# Patient Record
Sex: FEMALE | Race: BLACK OR AFRICAN AMERICAN
[De-identification: names, ages, dates, MRNs, and addresses within clinical notes are randomized per-mention and may not be internally consistent; named-entity substitution may affect disease eponyms.]

---

## 2017-04-03 ENCOUNTER — HOSPITAL ENCOUNTER (INPATIENT)
Dept: HOSPITAL 63 - ER | Age: 77
LOS: 1 days | Discharge: NURSING, REHAB OR LONG-TERM CARE | DRG: 811 | End: 2017-04-04
Attending: FAMILY MEDICINE | Admitting: FAMILY MEDICINE
Payer: MEDICARE

## 2017-04-03 VITALS — HEIGHT: 62 IN | BODY MASS INDEX: 37.54 KG/M2 | WEIGHT: 204 LBS

## 2017-04-03 DIAGNOSIS — E43: ICD-10-CM

## 2017-04-03 DIAGNOSIS — E78.00: ICD-10-CM

## 2017-04-03 DIAGNOSIS — Z79.899: ICD-10-CM

## 2017-04-03 DIAGNOSIS — I12.9: ICD-10-CM

## 2017-04-03 DIAGNOSIS — N18.4: ICD-10-CM

## 2017-04-03 DIAGNOSIS — K21.9: ICD-10-CM

## 2017-04-03 DIAGNOSIS — I10: ICD-10-CM

## 2017-04-03 DIAGNOSIS — D64.9: Primary | ICD-10-CM

## 2017-04-03 LAB
ALBUMIN SERPL-MCNC: 2.4 G/DL (ref 3.4–5)
ALP SERPL-CCNC: 77 U/L (ref 46–116)
ALT SERPL-CCNC: 25 U/L (ref 14–59)
AMPHETAMINE/METHAMPHETAMINE: (no result)
ANION GAP SERPL CALC-SCNC: 9 MMOL/L (ref 6–14)
APTT PPP: YELLOW S
AST SERPL-CCNC: 30 U/L (ref 15–37)
BACTERIA #/AREA URNS HPF: 0 /HPF
BARBITURATES UR-MCNC: (no result) UG/ML
BASOPHILS # BLD AUTO: 0.1 X10^3/UL (ref 0–0.2)
BASOPHILS NFR BLD: 1 % (ref 0–3)
BENZODIAZ UR-MCNC: (no result) UG/L
BILIRUB DIRECT SERPL-MCNC: 0.2 MG/DL (ref 0–0.2)
BILIRUB SERPL-MCNC: 0.5 MG/DL (ref 0.2–1)
BILIRUB UR QL STRIP: (no result)
CA-I SERPL ISE-MCNC: 53 MG/DL (ref 7–20)
CALCIUM SERPL-MCNC: 8.4 MG/DL (ref 8.5–10.1)
CANNABINOIDS UR-MCNC: (no result) UG/L
CHLORIDE SERPL-SCNC: 102 MMOL/L (ref 98–107)
CO2 SERPL-SCNC: 28 MMOL/L (ref 21–32)
COCAINE UR-MCNC: (no result) NG/ML
CREAT SERPL-MCNC: 2 MG/DL (ref 0.6–1)
EOSINOPHIL NFR BLD: 0.3 X10^3/UL (ref 0–0.7)
EOSINOPHIL NFR BLD: 4 % (ref 0–3)
ERYTHROCYTE [DISTWIDTH] IN BLOOD BY AUTOMATED COUNT: 13.1 % (ref 11.5–14.5)
FIBRINOGEN PPP-MCNC: CLEAR MG/DL
GFR SERPLBLD BASED ON 1.73 SQ M-ARVRAT: 29.2 ML/MIN
GLUCOSE SERPL-MCNC: 102 MG/DL (ref 70–99)
GLUCOSE UR STRIP-MCNC: (no result) MG/DL
HCT VFR BLD CALC: 20.8 % (ref 36–47)
HGB BLD-MCNC: 6.9 G/DL (ref 12–15.5)
LIPASE: 141 U/L (ref 73–393)
LYMPHOCYTES # BLD: 1.5 X10^3/UL (ref 1–4.8)
LYMPHOCYTES NFR BLD AUTO: 18 % (ref 24–48)
MCH RBC QN AUTO: 29 PG (ref 25–35)
MCHC RBC AUTO-ENTMCNC: 33 G/DL (ref 31–37)
MCV RBC AUTO: 86 FL (ref 79–100)
METHADONE SERPL-MCNC: (no result) NG/ML
MONO #: 1.1 X10^3/UL (ref 0–1.1)
MONOCYTES NFR BLD: 13 % (ref 0–9)
NEUT #: 5.4 X10^3UL (ref 1.8–7.7)
NEUTROPHILS NFR BLD AUTO: 65 % (ref 31–73)
NITRITE UR QL STRIP: (no result)
OPIATES UR-MCNC: (no result) NG/ML
PCP SERPL-MCNC: (no result) MG/DL
PLATELET # BLD AUTO: 343 X10^3/UL (ref 140–400)
POTASSIUM SERPL-SCNC: 3.7 MMOL/L (ref 3.5–5.1)
PROT SERPL-MCNC: 6.6 G/DL (ref 6.4–8.2)
RBC # BLD AUTO: 2.4 X10^6/UL (ref 3.5–5.4)
RBC #/AREA URNS HPF: 0 /HPF (ref 0–2)
SODIUM SERPL-SCNC: 139 MMOL/L (ref 136–145)
SP GR UR STRIP: <=1.005
SQUAMOUS #/AREA URNS LPF: (no result) /LPF
UROBILINOGEN UR-MCNC: 0.2 MG/DL
WBC # BLD AUTO: 8.4 X10^3/UL (ref 4–11)
WBC #/AREA URNS HPF: (no result) /HPF (ref 0–4)

## 2017-04-03 PROCEDURE — 85045 AUTOMATED RETICULOCYTE COUNT: CPT

## 2017-04-03 PROCEDURE — 82274 ASSAY TEST FOR BLOOD FECAL: CPT

## 2017-04-03 PROCEDURE — 96374 THER/PROPH/DIAG INJ IV PUSH: CPT

## 2017-04-03 PROCEDURE — P9016 RBC LEUKOCYTES REDUCED: HCPCS

## 2017-04-03 PROCEDURE — 86901 BLOOD TYPING SEROLOGIC RH(D): CPT

## 2017-04-03 PROCEDURE — 86900 BLOOD TYPING SEROLOGIC ABO: CPT

## 2017-04-03 PROCEDURE — 80053 COMPREHEN METABOLIC PANEL: CPT

## 2017-04-03 PROCEDURE — 84484 ASSAY OF TROPONIN QUANT: CPT

## 2017-04-03 PROCEDURE — 36415 COLL VENOUS BLD VENIPUNCTURE: CPT

## 2017-04-03 PROCEDURE — 96375 TX/PRO/DX INJ NEW DRUG ADDON: CPT

## 2017-04-03 PROCEDURE — 85027 COMPLETE CBC AUTOMATED: CPT

## 2017-04-03 PROCEDURE — 85610 PROTHROMBIN TIME: CPT

## 2017-04-03 PROCEDURE — 80076 HEPATIC FUNCTION PANEL: CPT

## 2017-04-03 PROCEDURE — 80048 BASIC METABOLIC PNL TOTAL CA: CPT

## 2017-04-03 PROCEDURE — 86920 COMPATIBILITY TEST SPIN: CPT

## 2017-04-03 PROCEDURE — 93005 ELECTROCARDIOGRAM TRACING: CPT

## 2017-04-03 PROCEDURE — 81001 URINALYSIS AUTO W/SCOPE: CPT

## 2017-04-03 PROCEDURE — 85730 THROMBOPLASTIN TIME PARTIAL: CPT

## 2017-04-03 PROCEDURE — 74022 RADEX COMPL AQT ABD SERIES: CPT

## 2017-04-03 PROCEDURE — 86850 RBC ANTIBODY SCREEN: CPT

## 2017-04-03 PROCEDURE — S0028 INJECTION, FAMOTIDINE, 20 MG: HCPCS

## 2017-04-03 PROCEDURE — 83690 ASSAY OF LIPASE: CPT

## 2017-04-03 NOTE — ED.ADGEN
Past History


Past Medical History:  Arthritis, GERD, High Cholesterol, Hypertension, Other


Past Surgical History:  No Surgical History, Other


Alcohol Use:  None


Drug Use:  None





Adult General


Chief Complaint


Chief Complaint


".. I been really tired ... and weak.. and they said I was anemic again.. and 

will need a transfusion again..."





HPI


HPI





Patient is a 76 year old female who presents with above hx  and complaints.  

She has a history of chronic anemia. Patient denies any GI symptoms or dark 

tarry stools. Etiology of anemia has not been determined and prior admissions 

and transfusions. Patient follows with Dr. Davies.  Patient denies any 

change in meds. Patient denies any travel. Patient denies any ill contacts. 

Patient denies any immunosuppression.





Review of Systems


Review of Systems





Constitutional: Denies fever or chills []


Eyes: Denies change in visual acuity, redness, or eye pain []


HENT: Denies nasal congestion or sore throat []


Respiratory: Denies cough or shortness of breath []


Cardiovascular: No additional information not addressed in HPI []


GI: Denies abdominal pain, nausea, vomiting, bloody stools or diarrhea []


: Denies dysuria or hematuria []


Musculoskeletal: Denies back pain or joint pain . Pt. has complaints of 

generalized fatigue and weakness


Integument: Denies rash or skin lesions []


Neurologic: Denies headache, focal weakness or sensory changes []  

Pt.complaints of dizziness


Endocrine: Denies polyuria or polydipsia []





Family History


Family History


Noncontributory





Current Medications


Current Medications





 Current Medications








 Medications


  (Trade)  Dose


 Ordered  Sig/Jeanne  Start Time


 Stop Time Status Last Admin


Dose Admin


 


 Famotidine


  (Pepcid)  20 mg  1X  ONCE  4/3/17 21:30


 4/3/17 21:31 DC 4/3/17 21:30


20 MG


 


 Ondansetron HCl


  (Zofran)  4 mg  1X  ONCE  4/3/17 21:30


 4/3/17 21:31 DC 4/3/17 21:30


4 MG


 


 Sodium Chloride


  (Iv Sodium


 Chloride 0.9%


 1,000ml)  1,000 ml @ 


 100 mls/hr  Q10H  4/3/17 21:01


 4/4/17 07:00 DC 4/3/17 21:01


100 MLS/HR





See nursing for home meds





Allergies


Allergies





 Allergies








Coded Allergies Type Severity Reaction Last Updated Verified


 


  No Known Drug Allergies    3/12/16 No











Physical Exam


Physical Exam





Constitutional: , no acute distress, non-toxic appearance. []


HENT: Normocephalic, atraumatic, bilateral external ears normal, oropharynx 

moist, no oral exudates, nose normal. []


Eyes: PERRLA, EOMI, conjunctiva pale, no discharge. [] Glasses


Neck: Normal range of motion, no tenderness, supple, no stridor. [] 


Cardiovascular:Heart rate regular rhythm, no murmur, PMI to the left


Lungs & Thorax:  Bilateral breath sounds clear to auscultation []


Abdomen: Bowel sounds normal, soft, no tenderness, no masses, no pulsatile 

masses. Obese. Declines rectal at this time.


Skin: Warm, dry, no erythema, no rash. [] 


Back: No tenderness, no CVA tenderness. [] 


Extremities: No tenderness, no cyanosis, no clubbing, ROM intact, no edema. [] 


Neurologic: Alert and oriented X 3,  no gross motor function deficits, no gross 

sensory function deficits, no gross focal deficits noted. []


Psychologic: Affect anxious, judgement normal, mood normal. []





Current Patient Data


Lab Results





 Laboratory Tests








Test


  4/3/17


20:20


 


White Blood Count


  8.4x10^3/uL


(4.0-11.0)


 


Red Blood Count


  2.40x10^6/uL


(3.50-5.40)  L


 


Hemoglobin


  6.9g/dL


(12.0-15.5)  *L


 


Hematocrit


  20.8%


(36.0-47.0)  L


 


Mean Corpuscular Volume 86fL ()  


 


Mean Corpuscular Hemoglobin 29pg (25-35)  


 


Mean Corpuscular Hemoglobin


Concent 33g/dL (31-37)


 


 


Red Cell Distribution Width


  13.1%


(11.5-14.5)


 


Platelet Count


  343x10^3/uL


(140-400)


 


Neutrophils (%) (Auto) 65% (31-73)  


 


Lymphocytes (%) (Auto) 18% (24-48)  L


 


Monocytes (%) (Auto) 13% (0-9)  H


 


Eosinophils (%) (Auto) 4% (0-3)  H


 


Basophils (%) (Auto) 1% (0-3)  


 


Neutrophils # (Auto)


  5.4x10^3uL


(1.8-7.7)


 


Lymphocytes # (Auto)


  1.5x10^3/uL


(1.0-4.8)


 


Monocytes # (Auto)


  1.1x10^3/uL


(0.0-1.1)


 


Eosinophils # (Auto)


  0.3x10^3/uL


(0.0-0.7)


 


Basophils # (Auto)


  0.1x10^3/uL


(0.0-0.2)


 


Prothrombin Time


  9.7SEC


(9.4-11.4)


 


Prothrombin Time INR 0.9 (0.9-1.1)  


 


PTT 26SEC (23-33)  


 


Sodium Level


  139mmol/L


(136-145)


 


Potassium Level


  3.7mmol/L


(3.5-5.1)


 


Chloride Level


  102mmol/L


()


 


Carbon Dioxide Level


  28mmol/L


(21-32)


 


Anion Gap 9 (6-14)  


 


Blood Urea Nitrogen


  53mg/dL (7-20)


H


 


Creatinine


  2.0mg/dL


(0.6-1.0)  H


 


Estimated GFR


(Cockcroft-Gault) 29.2  


 


 


Glucose Level


  102mg/dL


(70-99)  H


 


Calcium Level


  8.4mg/dL


(8.5-10.1)  L


 


Total Bilirubin


  0.5mg/dL


(0.2-1.0)


 


Direct Bilirubin


  0.2mg/dL


(0.0-0.2)


 


Aspartate Amino Transferase


(AST) 30U/L (15-37)  


 


 


Alanine Aminotransferase (ALT) 25U/L (14-59)  


 


Alkaline Phosphatase


  77U/L ()


 


 


Troponin I Quantitative


  0.040ng/mL


(0-0.055)


 


Total Protein


  6.6g/dL


(6.4-8.2)


 


Albumin


  2.4g/dL


(3.4-5.0)  L


 


Lipase


  141U/L


()











EKG


EKG


My interpretation EKG shows a sinus rhythm at 72 bpm. No findings acute STEMI 

with contralateral changes. []





Radiology/Procedures


Radiology/Procedures


I interpretation of chest x-ray shows borderline cardiomegaly. Degenerative 

joint changes. Calcified aortic .  Nonspecific bowel gas pattern []





Course & Med Decision Making


Course & Med Decision Making


Pertinent Labs and Imaging studies reviewed. (See chart for details)





Discussed presentation, testing and treatment plan with Dr. Davies. Will 

admit for further evaluation and transfusion





[]





Final Impression


Final Impression


1.  Anemia[] 


2. Elevated creatinine  & BUN


3. Severe malnutrition albumin 2.4


4. History of hypertension


5. Complaints of generalized fatigue and weakness


6. Arthritis


Problems:  





Dragon Disclaimer


Dragon Disclaimer


This electronic medical record was generated, in whole or in part, using a 

voice recognition dictation system.








KATT BARBER MD Apr 3, 2017 21:48

## 2017-04-03 NOTE — EKG
[facility] [address], [place] [address]

Test Date:    2017               Test Time:    22:02:58

Pat Name:     IRASEMA ANGLIN           Department:   

Patient ID:   [id number]           Room:          

Gender:       F                        Technician:   TALISHA

:          1940               Requested By: KATT BARBER

Order Number: [id number]            Reading MD:   Booker Puentes

                                 Measurements

Intervals                              Axis          

Rate:         72                       P:            29

GA:           154                      QRS:          7

QRSD:         76                       T:            36

QT:           378                                    

QTc:          415                                    

                           Interpretive Statements

SINUS RHYTHM



Electronically Signed On 2017 9:27:36 CDT by Booker Puentes

## 2017-04-03 NOTE — ACF
Admission Criteria Forms


                                                         


            ANEMIA, IRON DEFICIENCY OR UNSPECIFIED





Clinical Indications for Inpatient Care





                                                         (Place 'X' for any and 

all applicable criteria):





Admission is indicated for ANY ONE of the following(1)(2)(3)(4)(5)(6)(7): 





[X] I.   Inpatient admission required rather than observation care (Also use 

Anemia, Iron Deficiency or


        Unspecified:  Observation Care guideline as appropriate)  because of 

ANY ONE of the following:


        [] a)   Hemodynamic instability that is severe or persistent


        [] b)   Active bleeding that cannot be rapidly controlled


        [] c)   CVS symptoms (i.e., dyspnea, chest pain, heart failure) that 

are severe or persistent               


        [] d)   Neurologic symptoms (i.e., cognitive impairment, recurrent 

syncope or near syncope) that are severe or persistent


        [] e)   Cardiac arrhythmias of immediate concern


        [] f)    Acute peripheral ischemia (e.g., pulseless, cool, mottled, or 

cyanotic extremity)


        [] g)   High-risk low platelet count                  


        [] h)   Acute renal failure


        [] i)    Ongoing transfusion for blood loss (greater than 2 units)


        [] j)    IV fluid to replace significant ongoing (eg, >24 hours) losses 

(> 3 L/m2 per day)


        [] k)   Pulmonary artery catheter monitoring 


        [] l)    Supplemental oxygen or respiratory treatments for over 24 

hours that are performable only  in acute inpatient setting


        [] m)  Immediate inpatient surgery


        [X] n)   Other condition, treatment or monitoring requiring inpatient 

admission


[] II    Active massive hemorrhage


[] III.  Active hemolysis with rapidly progressive anemia [A](6)











Extended stay beyond goal length of stay may be needed for (17)(18)


[]a)   Diagnosed cause of anemia requiring longer hospitalization (eg, active 

GI bleeding, 


        immune hemolysis requiring electrophoresis, complications of malignancy 

requiring acute care 


[]b)   Continued emergent anemia indicators (23)            


[]c)   Transfusion reactions   


[]d)   Associated leukopenia or thrombocytopenia needing inpatient care   


[]e)   Active comorbidities (eg, renal failure, heart failure)











The original [facility] content created by [facility] 

Guidelines has been revised. 


The portions of the content which have been revised are identified through the 

use of italic text or in bold. [facility] 


has neither reviewed nor approved the modified material. All other unmodified 

content is copyright  [facility].





Please see references footnoted in the original [facility] edition 

2016





Admission Criteria Met?:  Yes








KERI PÉREZ Apr 3, 2017 22:31

## 2017-04-04 VITALS
SYSTOLIC BLOOD PRESSURE: 159 MMHG | SYSTOLIC BLOOD PRESSURE: 159 MMHG | DIASTOLIC BLOOD PRESSURE: 76 MMHG | SYSTOLIC BLOOD PRESSURE: 159 MMHG | DIASTOLIC BLOOD PRESSURE: 76 MMHG | DIASTOLIC BLOOD PRESSURE: 76 MMHG

## 2017-04-04 VITALS — DIASTOLIC BLOOD PRESSURE: 59 MMHG | SYSTOLIC BLOOD PRESSURE: 123 MMHG

## 2017-04-04 VITALS — DIASTOLIC BLOOD PRESSURE: 52 MMHG | SYSTOLIC BLOOD PRESSURE: 123 MMHG

## 2017-04-04 VITALS — SYSTOLIC BLOOD PRESSURE: 159 MMHG | DIASTOLIC BLOOD PRESSURE: 76 MMHG

## 2017-04-04 VITALS — SYSTOLIC BLOOD PRESSURE: 126 MMHG | DIASTOLIC BLOOD PRESSURE: 48 MMHG

## 2017-04-04 VITALS — DIASTOLIC BLOOD PRESSURE: 53 MMHG | SYSTOLIC BLOOD PRESSURE: 117 MMHG

## 2017-04-04 VITALS — SYSTOLIC BLOOD PRESSURE: 122 MMHG | DIASTOLIC BLOOD PRESSURE: 48 MMHG

## 2017-04-04 VITALS — DIASTOLIC BLOOD PRESSURE: 70 MMHG | SYSTOLIC BLOOD PRESSURE: 124 MMHG

## 2017-04-04 VITALS — DIASTOLIC BLOOD PRESSURE: 53 MMHG | SYSTOLIC BLOOD PRESSURE: 127 MMHG

## 2017-04-04 VITALS — SYSTOLIC BLOOD PRESSURE: 120 MMHG | DIASTOLIC BLOOD PRESSURE: 49 MMHG

## 2017-04-04 VITALS — SYSTOLIC BLOOD PRESSURE: 123 MMHG | DIASTOLIC BLOOD PRESSURE: 59 MMHG

## 2017-04-04 LAB
ALBUMIN SERPL-MCNC: 2.2 G/DL (ref 3.4–5)
ALBUMIN/GLOB SERPL: 0.6 {RATIO} (ref 1–1.7)
ALP SERPL-CCNC: 68 U/L (ref 46–116)
ALT SERPL-CCNC: 20 U/L (ref 14–59)
ANION GAP SERPL CALC-SCNC: 7 MMOL/L (ref 6–14)
AST SERPL-CCNC: 23 U/L (ref 15–37)
BASOPHILS # BLD AUTO: 0.1 X10^3/UL (ref 0–0.2)
BASOPHILS NFR BLD: 1 % (ref 0–3)
BILIRUB SERPL-MCNC: 0.6 MG/DL (ref 0.2–1)
BUN/CREAT SERPL: 26 (ref 6–20)
CA-I SERPL ISE-MCNC: 44 MG/DL (ref 7–20)
CALCIUM SERPL-MCNC: 8.4 MG/DL (ref 8.5–10.1)
CHLORIDE SERPL-SCNC: 106 MMOL/L (ref 98–107)
CO2 SERPL-SCNC: 28 MMOL/L (ref 21–32)
CREAT SERPL-MCNC: 1.7 MG/DL (ref 0.6–1)
EOSINOPHIL NFR BLD: 0.3 X10^3/UL (ref 0–0.7)
EOSINOPHIL NFR BLD: 5 % (ref 0–3)
ERYTHROCYTE [DISTWIDTH] IN BLOOD BY AUTOMATED COUNT: 13.8 % (ref 11.5–14.5)
FECAL OB PT: NEGATIVE
GFR SERPLBLD BASED ON 1.73 SQ M-ARVRAT: 35.2 ML/MIN
GLOBULIN SER-MCNC: 4 G/DL (ref 2.2–3.8)
GLUCOSE SERPL-MCNC: 96 MG/DL (ref 70–99)
HCT VFR BLD CALC: 25.2 % (ref 36–47)
HGB BLD-MCNC: 8.4 G/DL (ref 12–15.5)
LYMPHOCYTES # BLD: 1.3 X10^3/UL (ref 1–4.8)
LYMPHOCYTES NFR BLD AUTO: 18 % (ref 24–48)
MCH RBC QN AUTO: 29 PG (ref 25–35)
MCHC RBC AUTO-ENTMCNC: 33 G/DL (ref 31–37)
MCV RBC AUTO: 86 FL (ref 79–100)
MONO #: 1.1 X10^3/UL (ref 0–1.1)
MONOCYTES NFR BLD: 15 % (ref 0–9)
NEUT #: 4.5 X10^3UL (ref 1.8–7.7)
NEUTROPHILS NFR BLD AUTO: 62 % (ref 31–73)
PLATELET # BLD AUTO: 301 X10^3/UL (ref 140–400)
POTASSIUM SERPL-SCNC: 3.8 MMOL/L (ref 3.5–5.1)
PROT SERPL-MCNC: 6.2 G/DL (ref 6.4–8.2)
RBC # BLD AUTO: 2.93 X10^6/UL (ref 3.5–5.4)
SODIUM SERPL-SCNC: 141 MMOL/L (ref 136–145)
WBC # BLD AUTO: 7.3 X10^3/UL (ref 4–11)

## 2017-04-04 PROCEDURE — 30233N1 TRANSFUSION OF NONAUTOLOGOUS RED BLOOD CELLS INTO PERIPHERAL VEIN, PERCUTANEOUS APPROACH: ICD-10-PCS | Performed by: EMERGENCY MEDICINE

## 2017-04-04 NOTE — RAD
Acute abdomen series 





History:  Evaluate GI bleeding, anemia, constipation.



Comparison:  None. 



Findings:  Frontal view of the chest.  Cardiac silhouette appears within

normal limits for size.  No pneumoperitoneum or pneumothorax is identified. 

No acute infiltrate is seen.  Aortic atherosclerosis is seen.



Supine and upright views of the abdomen.  No dilated loops of bowel are seen. 

Mild-moderate colonic stool is present. Degenerative changes are present with

spine. Multiple calcifications are scattered in the pelvis.



Impression:  No acute abnormality identified in the chest or abdomen.

## 2017-04-10 NOTE — HP
ADMIT DATE:  04/03/2017



HISTORY OF PRESENT ILLNESS:  A 76-year-old female recently had a knee

replacement performed and as a result of this, the patient came in needing a

blood transfusion.  She was noted to be very weak and tired, could not go on as

a result of this severe anemia with a hemoglobin of 6.9 and 18 or so, the

patient was admitted to the hospital for observation for IV transfusion of some

packed RBCs.



PAST MEDICAL HISTORY:  Cardiac problems, hypercholesterinemia, hypertension,

arthritis, orthopedic surgery left knee replacement 2017 and right knee in 2016.

 She is having chronic anemia.  She has had her influenza and pneumococcal

vaccinations.



ALLERGIES:  No known drug allergies.



FAMILY HISTORY:  Positive for hypertension in her daughter and father and mother

with heart disease.



MEDICATIONS:  Include Norvasc 10, famotidine or Pepcid 20, hydrochlorothiazide

25, Lidoderm patch daily, lisinopril 40, Meloxicam 7.5 daily, potassium chloride

10 mEq daily and Ultram.



SOCIAL HISTORY:  The patient denies smoking, alcohol or drug use.



REVIEW OF SYSTEMS:  Denies headaches, vision change, blurred vision, double

vision.  Does have generalized weakness.  Denies shortness of breath, chest

pain.  Denies any melena, hematochezia, hematemesis and neurologically intact.



PHYSICAL EXAMINATION:

GENERAL:  Pleasant -American female, just generally weak, tired and run

down.

VITAL SIGNS:  The patient's blood pressure 120/59, pulse 70, afebrile, good

oxygen saturation.

HEENT:  The patient's head was atraumatic, normocephalic.  Eyes:  PERRLA without

jaundice.  Mouth and throat were normal.  Nose without discharge.

NECK:  Supple.  No JVD, carotid bruits.  No thyromegaly.

LUNGS:  Diminished throughout, but clear.

CARDIOVASCULAR:  Regular sinus rhythm.

ABDOMEN:  Soft, nontender, no rebound or guarding, positive bowel sounds, no

hepatosplenomegaly.

EXTREMITIES:  No clubbing, cyanosis or edema.

NEUROLOGIC:  The patient was alert and oriented x 3.



The patient was admitted for further evaluation and treatment.



LABORATORY DATA:  Hemoglobin was 6.9 and 20.  White count 8.  Electrolytes were

all basically normal except she has chronic kidney disease, creatinine of 2, GFR

of 29.  The patient's albumin low at 2.4, so it is severe protein malnutrition.



IMPRESSION:  Anemia, postoperatively left knee replacement, severe protein

malnutrition, history of hypertension, history of chronic kidney disease 4,

history of AB positive blood.



PLAN:  The patient will be transfused 2 units packed RBCs, make further

evaluation on her as indicated.





______________________________

SOFI ONEIL MD



DR:  MANOJ/leonard  JOB#:  [id number] / [id number]

DD:  04/09/2017 12:34  DT:  04/09/2017 16:43

## 2017-04-19 NOTE — DS
DATE OF DISCHARGE:  04/04/2017



A 76-year-old -American female who recently had her knee replaced, came

in from the nursing facility with low hemoglobin of 6.9 and 20.  She was

extremely weak, tired and exacerbated.  As a result of this, the patient

received 2 units of packed RBCs.  Her hemoglobin went from 6.9 up to 8.4.  She

felt much, much better and was discharged home.  She may have been somewhat

dehydrated.  Her creatinine went from 2 to 1.7.  The patient's urine was also

basically stable.  In any case, the patient made excellent progress.  Discharge

____ see MRAD and activity as indicated and make further evaluation on her as

indicated per those results, as an outpatient she will be entered.  Regular diet

should go back.





______________________________

SOFI ONEIL MD



DR:  MANOJ/leonard  JOB#:  [id number] / [id number]

DD:  04/19/2017 12:44  DT:  04/19/2017 19:42

## 2019-04-17 ENCOUNTER — HOSPITAL ENCOUNTER (OUTPATIENT)
Dept: HOSPITAL 63 - US | Age: 79
Discharge: HOME | End: 2019-04-17
Attending: NURSE PRACTITIONER
Payer: MEDICARE

## 2019-04-17 DIAGNOSIS — N18.4: ICD-10-CM

## 2019-04-17 DIAGNOSIS — I65.21: Primary | ICD-10-CM

## 2019-04-17 DIAGNOSIS — I12.9: ICD-10-CM

## 2019-04-17 PROCEDURE — 93880 EXTRACRANIAL BILAT STUDY: CPT

## 2019-04-17 NOTE — RAD
EXAM: Carotid Doppler sonogram.

 

HISTORY: Lightheadedness.

 

TECHNIQUE: Gray scale and color Doppler sonographic evaluation of the neck

with spectral waveform analysis was performed and static images are 

submitted for review. 

 

FINDINGS: The exam is limited due to patient body habitus.

 

There is moderate atherosclerotic plaque within the right carotid bulb and

intimal thickening involving the bilateral common carotid arteries. There 

The peak systolic velocity within the right common carotid artery is 76 

cm/sec. The peak systolic velocity within the right internal carotid 

artery is 92 cm/sec and the end diastolic velocity within the right 

internal carotid artery is 19 cm/sec. The right ICA/CCA ratio is 1.24.

 

The peak systolic velocity within the left common carotid artery is 114 

cm/sec. The peak systolic velocity within the left internal carotid artery

is 116 cm/sec and the end diastolic velocity within the left internal 

carotid artery is 28 cm/sec. The left ICA/CCA ratio is 1.03.

 

There is normal antegrade flow within both vertebral arteries.

 

IMPRESSION:

1. No Doppler evidence of hemodynamically significant stenosis within the 

carotid or vertebral arteries. The exam is limited due to body habitus.

2. Moderate atherosclerotic plaque within the right carotid bulb and 

intimal thickening involving the common carotid arteries.

 

 

PQRS Compliance Statement - Stenosis calculations for CT, MR and 

conventional angiography are based upon measurement of the distal ICA 

diameter in accordance with the NASCET methodology.  Stenosis calculations

for carotid ultrasound studies are derived from validated velocity 

criteria which are known to correlate with the NASCET methodology.

 

Electronically signed by: Floridalma Sandhu MD (4/17/2019 1:09 PM) [place] [id number]

## 2020-04-25 ENCOUNTER — HOSPITAL ENCOUNTER (OUTPATIENT)
Dept: HOSPITAL 63 - CT | Age: 80
Discharge: HOME | End: 2020-04-25
Attending: FAMILY MEDICINE
Payer: MEDICARE

## 2020-04-25 DIAGNOSIS — I67.82: Primary | ICD-10-CM

## 2020-04-25 PROCEDURE — 70450 CT HEAD/BRAIN W/O DYE: CPT

## 2020-04-25 NOTE — RAD
INDICATION: Headache

 

COMPARISON: None.

 

TECHNIQUE: 

 

Axial CT images obtained through the head without intravenous contrast.

 

One or more of the following individualized dose reduction techniques were

utilized for this examination:  1. Automated exposure control;  2. 

Adjustment of the mA and/or kV according to patient size;  3. Use of 

iterative reconstruction technique.

 

FINDINGS:

 

No intracranial hemorrhage.

No midline shift.  Basal cisterns patents.

Ventricles and sulci are globally prominent.

No acute osseous abnormality.

Orbits and paranasal sinuses unremarkable.

Scattered foci of low attenuation within the white matter.

 

IMPRESSION:

 

1.   No acute intracranial hemorrhage.

2.  Scattered regions of low attenuation within the white matter.  

Non-specific in nature but frequently secondary to chronic small vessel 

ischemic disease.

3.  Prominence of ventricles and sulci which is frequently secondary to 

age related volume loss.

 

Electronically signed by: Tate Jenkins MD (4/25/2020 1:27 PM) [id number]